# Patient Record
Sex: MALE | Race: WHITE | NOT HISPANIC OR LATINO | Employment: UNEMPLOYED | URBAN - METROPOLITAN AREA
[De-identification: names, ages, dates, MRNs, and addresses within clinical notes are randomized per-mention and may not be internally consistent; named-entity substitution may affect disease eponyms.]

---

## 2017-04-04 ENCOUNTER — HOSPITAL ENCOUNTER (EMERGENCY)
Facility: HOSPITAL | Age: 22
Discharge: HOME | End: 2017-04-04
Attending: EMERGENCY MEDICINE
Payer: MEDICAID

## 2017-04-04 VITALS
BODY MASS INDEX: 44.1 KG/M2 | OXYGEN SATURATION: 98 % | SYSTOLIC BLOOD PRESSURE: 129 MMHG | TEMPERATURE: 98 F | WEIGHT: 315 LBS | HEIGHT: 71 IN | HEART RATE: 87 BPM | DIASTOLIC BLOOD PRESSURE: 70 MMHG | RESPIRATION RATE: 18 BRPM

## 2017-04-04 DIAGNOSIS — F12.10 MARIJUANA ABUSE: ICD-10-CM

## 2017-04-04 DIAGNOSIS — F19.10 SUBSTANCE ABUSE: Primary | ICD-10-CM

## 2017-04-04 DIAGNOSIS — R41.82 ALTERED MENTAL STATUS, UNSPECIFIED ALTERED MENTAL STATUS TYPE: ICD-10-CM

## 2017-04-04 DIAGNOSIS — R74.8 ELEVATED LIVER ENZYMES: ICD-10-CM

## 2017-04-04 LAB
ALBUMIN SERPL BCP-MCNC: 3.9 G/DL
ALP SERPL-CCNC: 69 U/L
ALT SERPL W/O P-5'-P-CCNC: 788 U/L
AMPHET+METHAMPHET UR QL: NEGATIVE
ANION GAP SERPL CALC-SCNC: 10 MMOL/L
APAP SERPL-MCNC: <3 UG/ML
AST SERPL-CCNC: 387 U/L
BACTERIA #/AREA URNS HPF: NORMAL /HPF
BARBITURATES UR QL SCN>200 NG/ML: NEGATIVE
BASOPHILS # BLD AUTO: 0.02 K/UL
BASOPHILS NFR BLD: 0.3 %
BENZODIAZ UR QL SCN>200 NG/ML: NEGATIVE
BILIRUB SERPL-MCNC: 0.8 MG/DL
BILIRUB UR QL STRIP: NEGATIVE
BUN SERPL-MCNC: 12 MG/DL
BZE UR QL SCN: NEGATIVE
CALCIUM SERPL-MCNC: 8.6 MG/DL
CANNABINOIDS UR QL SCN: ABNORMAL
CHLORIDE SERPL-SCNC: 105 MMOL/L
CLARITY UR: CLEAR
CO2 SERPL-SCNC: 24 MMOL/L
COLOR UR: YELLOW
CREAT SERPL-MCNC: 1.2 MG/DL
CREAT UR-MCNC: 19.9 MG/DL
DIFFERENTIAL METHOD: ABNORMAL
EOSINOPHIL # BLD AUTO: 0.1 K/UL
EOSINOPHIL NFR BLD: 2 %
ERYTHROCYTE [DISTWIDTH] IN BLOOD BY AUTOMATED COUNT: 13.6 %
EST. GFR  (AFRICAN AMERICAN): >60 ML/MIN/1.73 M^2
EST. GFR  (NON AFRICAN AMERICAN): >60 ML/MIN/1.73 M^2
ETHANOL SERPL-MCNC: <10 MG/DL
GLUCOSE SERPL-MCNC: 133 MG/DL
GLUCOSE UR QL STRIP: NEGATIVE
HCT VFR BLD AUTO: 41.1 %
HGB BLD-MCNC: 14.3 G/DL
HGB UR QL STRIP: NEGATIVE
KETONES UR QL STRIP: NEGATIVE
LEUKOCYTE ESTERASE UR QL STRIP: ABNORMAL
LYMPHOCYTES # BLD AUTO: 1 K/UL
LYMPHOCYTES NFR BLD: 16.1 %
MCH RBC QN AUTO: 31 PG
MCHC RBC AUTO-ENTMCNC: 34.8 %
MCV RBC AUTO: 89 FL
METHADONE UR QL SCN>300 NG/ML: NEGATIVE
MICROSCOPIC COMMENT: NORMAL
MONOCYTES # BLD AUTO: 0.4 K/UL
MONOCYTES NFR BLD: 6.5 %
NEUTROPHILS # BLD AUTO: 4.8 K/UL
NEUTROPHILS NFR BLD: 75.1 %
NITRITE UR QL STRIP: NEGATIVE
OPIATES UR QL SCN: ABNORMAL
PCP UR QL SCN>25 NG/ML: NEGATIVE
PH UR STRIP: 7 [PH] (ref 5–8)
PLATELET # BLD AUTO: 280 K/UL
PMV BLD AUTO: 10.9 FL
POTASSIUM SERPL-SCNC: 3.9 MMOL/L
PROT SERPL-MCNC: 7.4 G/DL
PROT UR QL STRIP: NEGATIVE
RBC # BLD AUTO: 4.62 M/UL
SALICYLATES SERPL-MCNC: <5 MG/DL
SODIUM SERPL-SCNC: 139 MMOL/L
SP GR UR STRIP: <=1.005 (ref 1–1.03)
TOXICOLOGY INFORMATION: ABNORMAL
URN SPEC COLLECT METH UR: ABNORMAL
UROBILINOGEN UR STRIP-ACNC: NEGATIVE EU/DL
WBC # BLD AUTO: 6.44 K/UL
WBC #/AREA URNS HPF: 2 /HPF (ref 0–5)

## 2017-04-04 PROCEDURE — 82570 ASSAY OF URINE CREATININE: CPT

## 2017-04-04 PROCEDURE — 99284 EMERGENCY DEPT VISIT MOD MDM: CPT

## 2017-04-04 PROCEDURE — 85025 COMPLETE CBC W/AUTO DIFF WBC: CPT

## 2017-04-04 PROCEDURE — 80307 DRUG TEST PRSMV CHEM ANLYZR: CPT

## 2017-04-04 PROCEDURE — 80053 COMPREHEN METABOLIC PANEL: CPT

## 2017-04-04 PROCEDURE — 81000 URINALYSIS NONAUTO W/SCOPE: CPT

## 2017-04-04 PROCEDURE — 80329 ANALGESICS NON-OPIOID 1 OR 2: CPT

## 2017-04-04 PROCEDURE — 80320 DRUG SCREEN QUANTALCOHOLS: CPT

## 2017-04-04 PROCEDURE — 93010 ELECTROCARDIOGRAM REPORT: CPT | Mod: ,,, | Performed by: INTERNAL MEDICINE

## 2017-04-04 NOTE — ED PROVIDER NOTES
SCRIBE #1 NOTE: I, Wilber Urrutia, am scribing for, and in the presence of, Mae Carbajal DO. I have scribed the HPI, ROS, and PEx.     SCRIBE #2 NOTE: I, Gina Wayne/Kyle Camp, am scribing for, and in the presence of,  Bonilla Wayne MD. I have scribed the remaining portions of the note not scribed by Scribe #1.     History      Chief Complaint   Patient presents with    Altered Mental Status     pt reports smoking weed this afternoon, found unresponsive in car       Review of patient's allergies indicates:  Allergies not on file     HPI   HPI    4/4/2017, 3:07 PM   History obtained from the patient and EMS      History of Present Illness: Debbie Montano is a 22 y.o. male patient who presents to the Emergency Department for AMS which was noticed today. EMS states per fire department, pt was found in his parked vehicle in the middle of the road unresponsive and barely breathing. EMS states when they arrived on scene pt was awake, alert, and oriented x3. Pt reports smoking a gram and a half of weed and states he fell asleep in his car. Pt states he was told via police that the weed he smoked have been laced with another type of drug. Pt reports smoking weed everyday. Pt is denying any sxs at this time. Patient denies any fever, N/V/D, chills, SOB, confusion, weakness/numbness, palpitations, lightheadedness, dizziness, chest tightness, CP, HI, SI, IV drug use, ETOH abuse, sleep changes, auditory/visual hallucinations, depression and all other sxs at this time. No further complaints or concerns at this time.     Arrival mode: EMS    PCP: Unknown       Past Medical History:  Past medical history reviewed not relevant      Past Surgical History:  Past surgical history reviewed not relevant      Family History:  Family history reviewed not relevant      Social History:  Social History    Social History Main Topics    Social History Main Topics    Smoking status: Unknown if ever smoked    Smokeless tobacco:  Unknown if ever used    Alcohol Use: Unknown drinking history    Drug Use: Unknown if ever used    Sexual Activity: Unknown         ROS   Review of Systems   Constitutional: Negative for chills and fever.   HENT: Negative for congestion and sore throat.    Respiratory: Negative for chest tightness and shortness of breath.    Cardiovascular: Negative for chest pain.   Gastrointestinal: Negative for abdominal pain, nausea and vomiting.   Musculoskeletal: Negative for back pain and neck pain.   Skin: Negative for rash.   Neurological: Negative for dizziness, numbness and headaches.   Psychiatric/Behavioral: Negative for agitation and confusion.   All other systems reviewed and are negative.      Physical Exam    Initial Vitals   BP Pulse Resp Temp SpO2   04/04/17 1500 04/04/17 1500 04/04/17 1500 04/04/17 1500 04/04/17 1500   146/90 120 9 97.6 °F (36.4 °C) 92 %      Physical Exam  Nursing Notes and Vital Signs Reviewed.  Constitutional: Patient is in no apparent distress. Awake and alert. Well-developed and well-nourished. No trauma noted.   Head: Atraumatic. Normocephalic.  Eyes: PERRL. EOM intact. Conjunctivae are not pale. No scleral icterus. Bilateral eyes are blood shot.   ENT: Mucous membranes are moist. Oropharynx is clear and symmetric.    Neck: Supple. Full ROM. No lymphadenopathy.  Cardiovascular: Regular rate. Regular rhythm. No murmurs, rubs, or gallops. Distal pulses are 2+ and symmetric.  Pulmonary/Chest: No respiratory distress. Clear to auscultation bilaterally. No wheezing, rales, or rhonchi.  Abdominal: Soft and non-distended.  There is no tenderness.  No rebound, guarding, or rigidity. Good bowel sounds.  Musculoskeletal: Moves all extremities. No obvious deformities. No edema. No calf tenderness. No trauma noted.  Back: Skin appears normal without abrasions or bruising. No erythema, induration, or fluctuance.   Skin: Warm and dry.  Neurological:  Alert, awake, oriented x3, and appropriate. Pt is  "laying comfortably in ED bed and using his phone.  Normal speech.  No acute focal neurological deficits are appreciated.  Psychiatric: Normal affect. Good eye contact. Appropriate in content.    ED Course    Procedures  ED Vital Signs:  Vitals:    04/04/17 1500 04/04/17 1507 04/04/17 1523 04/04/17 1540   BP: (!) 146/90 (!) 146/90 126/73    Pulse: (!) 120 (!) 120 (!) 113 (!) 113   Resp: (!) 9 14 20    Temp: 97.6 °F (36.4 °C) 98 °F (36.7 °C)     TempSrc: Oral Oral     SpO2: (!) 92% (!) 93% 98%    Weight: (!) 149.7 kg (330 lb) (!) 149.7 kg (330 lb)     Height: 5' 11" (1.803 m) 5' 11" (1.803 m)      04/04/17 1606 04/04/17 1637   BP: 135/77 129/70   Pulse: 107 87   Resp: 20 18   Temp:     TempSrc:     SpO2: 95% 98%   Weight:     Height:         Abnormal Lab Results:  Labs Reviewed   CBC W/ AUTO DIFFERENTIAL - Abnormal; Notable for the following:        Result Value    Gran% 75.1 (*)     Lymph% 16.1 (*)     All other components within normal limits   COMPREHENSIVE METABOLIC PANEL - Abnormal; Notable for the following:     Glucose 133 (*)     Calcium 8.6 (*)      (*)      (*)     All other components within normal limits   SALICYLATE LEVEL - Abnormal; Notable for the following:     Salicylate Lvl <5.0 (*)     All other components within normal limits   ACETAMINOPHEN LEVEL - Abnormal; Notable for the following:     Acetaminophen (Tylenol), Serum <3.0 (*)     All other components within normal limits   DRUG SCREEN PANEL, URINE EMERGENCY - Abnormal; Notable for the following:     Creatinine, Random Ur 19.9 (*)     All other components within normal limits   URINALYSIS - Abnormal; Notable for the following:     Specific Gravity, UA <=1.005 (*)     Leukocytes, UA Trace (*)     All other components within normal limits   ALCOHOL,MEDICAL (ETHANOL)   URINALYSIS MICROSCOPIC        All Lab Results:  Results for orders placed or performed during the hospital encounter of 04/04/17   CBC auto differential   Result Value " Ref Range    WBC 6.44 3.90 - 12.70 K/uL    RBC 4.62 4.60 - 6.20 M/uL    Hemoglobin 14.3 14.0 - 18.0 g/dL    Hematocrit 41.1 40.0 - 54.0 %    MCV 89 82 - 98 fL    MCH 31.0 27.0 - 31.0 pg    MCHC 34.8 32.0 - 36.0 %    RDW 13.6 11.5 - 14.5 %    Platelets 280 150 - 350 K/uL    MPV 10.9 9.2 - 12.9 fL    Gran # 4.8 1.8 - 7.7 K/uL    Lymph # 1.0 1.0 - 4.8 K/uL    Mono # 0.4 0.3 - 1.0 K/uL    Eos # 0.1 0.0 - 0.5 K/uL    Baso # 0.02 0.00 - 0.20 K/uL    Gran% 75.1 (H) 38.0 - 73.0 %    Lymph% 16.1 (L) 18.0 - 48.0 %    Mono% 6.5 4.0 - 15.0 %    Eosinophil% 2.0 0.0 - 8.0 %    Basophil% 0.3 0.0 - 1.9 %    Differential Method Automated    Comprehensive metabolic panel   Result Value Ref Range    Sodium 139 136 - 145 mmol/L    Potassium 3.9 3.5 - 5.1 mmol/L    Chloride 105 95 - 110 mmol/L    CO2 24 23 - 29 mmol/L    Glucose 133 (H) 70 - 110 mg/dL    BUN, Bld 12 6 - 20 mg/dL    Creatinine 1.2 0.5 - 1.4 mg/dL    Calcium 8.6 (L) 8.7 - 10.5 mg/dL    Total Protein 7.4 6.0 - 8.4 g/dL    Albumin 3.9 3.5 - 5.2 g/dL    Total Bilirubin 0.8 0.1 - 1.0 mg/dL    Alkaline Phosphatase 69 55 - 135 U/L     (H) 10 - 40 U/L     (H) 10 - 44 U/L    Anion Gap 10 8 - 16 mmol/L    eGFR if African American >60 >60 mL/min/1.73 m^2    eGFR if non African American >60 >60 mL/min/1.73 m^2   Ethanol   Result Value Ref Range    Alcohol, Medical, Serum <10 <10 mg/dL   Salicylate level   Result Value Ref Range    Salicylate Lvl <5.0 (L) 15.0 - 30.0 mg/dL   Acetaminophen level   Result Value Ref Range    Acetaminophen (Tylenol), Serum <3.0 (L) 10.0 - 20.0 ug/mL   Drug screen panel, emergency   Result Value Ref Range    Benzodiazepines Negative     Methadone metabolites Negative     Cocaine (Metab.) Negative     Opiate Scrn, Ur Presumptive Positive     Barbiturate Screen, Ur Negative     Amphetamine Screen, Ur Negative     THC Presumptive Positive     Phencyclidine Negative     Creatinine, Random Ur 19.9 (L) 23.0 - 375.0 mg/dL    Toxicology Information  "SEE COMMENT    Urinalysis   Result Value Ref Range    Specimen UA Urine, Clean Catch     Color, UA Yellow Yellow, Straw, Nury    Appearance, UA Clear Clear    pH, UA 7.0 5.0 - 8.0    Specific Gravity, UA <=1.005 (A) 1.005 - 1.030    Protein, UA Negative Negative    Glucose, UA Negative Negative    Ketones, UA Negative Negative    Bilirubin (UA) Negative Negative    Occult Blood UA Negative Negative    Nitrite, UA Negative Negative    Urobilinogen, UA Negative <2.0 EU/dL    Leukocytes, UA Trace (A) Negative   Urinalysis Microscopic   Result Value Ref Range    WBC, UA 2 0 - 5 /hpf    Bacteria, UA Occasional None-Occ /hpf    Microscopic Comment SEE COMMENT             The EKG was ordered, reviewed, and independently interpreted by the ED provider.  Interpretation time: 1529  Rate: 113 BPM  Rhythm: sinus tachycardia  Interpretation: Otherwise normal EKG. No STEMI.         The Emergency Provider reviewed the vital signs and test results, which are outlined above.    ED Discussion     4:20 PM: Dr. Carbajal transfers care of pt to Dr. Wayne, pending lab results.    4:43 PM: Reassessed pt at this time.  Pt states his condition has improved at this time. Pt is awake, alert, and interactive. Pt is currently ambulatory in ED. Pt reports smoking marijuana that was "laced with something". Pt was discharged with substance abuse information. Discussed with pt all pertinent ED information and results. Discussed pt dx and plan of tx. Gave pt all f/u and return to the ED instructions. All questions and concerns were addressed at this time. Pt expresses understanding of information and instructions, and is comfortable with plan to discharge. Pt is stable for discharge.    Pre-hypertension/Hypertension: The pt has been informed that they may have pre-hypertension or hypertension based on a blood pressure reading in the ED. I recommend that the pt call the PCP listed on their discharge instructions or a physician of their choice this " week to arrange f/u for further evaluation of possible pre-hypertension or hypertension.       ED Medication(s):  Medications - No data to display    There are no discharge medications for this patient.      Follow-up Information     Call [facility].    Why:  As needed to schedule appt for recheck- use substance abuse referrals given to you as needed to get help if you feel as if you have a problem with drug abuse     Contact information:    [address] [facility] [id number] [phone]              Medical Decision Making    Medical Decision Making:   Clinical Tests:   Lab Tests: Ordered and Reviewed  Medical Tests: Reviewed and Ordered           Scribe Attestation:   Scribe #1: I performed the above scribed service and the documentation accurately describes the services I performed. I attest to the accuracy of the note.    Attending:   Physician Attestation Statement for Scribe #1: I, Mae Carbajal DO, personally performed the services described in this documentation, as scribed by Wilber Urrutia, in my presence, and it is both accurate and complete.       Scribe Attestation:   Scribe #2: I performed the above scribed service and the documentation accurately describes the services I performed. I attest to the accuracy of the note.    Attending Attestation:           Physician Attestation for Scribe:    Physician Attestation Statement for Scribe #2: I, Bonilla Wayne MD, reviewed documentation, as scribed by Gina Wayne/Kyle Camp in my presence, and it is both accurate and complete. I also acknowledge and confirm the content of the note done by Scribe #1.          Clinical Impression       ICD-10-CM ICD-9-CM   1. Substance abuse F19.10 305.90   2. Marijuana abuse F12.10 305.20   3. Altered mental status, unspecified altered mental status type R41.82 780.97   4. Elevated liver enzymes R74.8 790.5       Disposition:   Disposition: Discharged  Condition: Stable         Mae PANTOJA  DO Raven  04/05/17 4338

## 2017-04-04 NOTE — ED AVS SNAPSHOT
"          [facility]  [address] [facility] [address]               Debbie Montano   2017  3:01 PM   ED    Description:  Male : 1995   Department:  [facility]            Your Care was Coordinated By:     Provider Role From To    Mae Carbajal DO Attending Provider 17 1506 17 1629    Ankur Wayne Jr., MD Attending Provider 17 1629 --      Reason for Visit     Altered Mental Status           Diagnoses this Visit        Comments    Substance abuse    -  Primary     Marijuana abuse         Altered mental status, unspecified altered mental status type           ED Disposition     None           To Do List           Follow-up Information     Call [facility].    Why:  As needed to schedule appt for recheck- use substance abuse referrals given to you as needed to get help if you feel as if you have a problem with drug abuse     Contact information:    [address] [facility] [address]  [phone]        [facility]     [facility] -  Assistance  Unless otherwise directed by your provider, please contact [facility], our nurse care line that is available for  assistance.     Registered nurses in the [facility] provide: appointment scheduling, clinical advisement, health education, and other advisory services.  Call: [phone] (toll free)               Medications                Verify that the below list of medications is an accurate representation of the medications you are currently taking.  If none reported, the list may be blank. If incorrect, please contact your healthcare provider. Carry this list with you in case of emergency.                Clinical Reference Information           Your Vitals Were     BP Pulse Temp Resp Height Weight    135/77 107 98 °F (36.7 °C) (Oral) 20 5' 11" (1.803 m) 149.7 kg (330 lb)    SpO2 BMI             95% 46.03 kg/m2    " "     Allergies as of 4/4/2017     No Known Allergies      Immunizations Administered on Date of Encounter - 4/4/2017     None      ED Micro, Lab, POCT     Start Ordered       Status Ordering Provider    04/04/17 1510 04/04/17 1509  Drug screen panel, emergency  STAT      Final result     04/04/17 1510 04/04/17 1509  Urinalysis  STAT      Final result     04/04/17 1509 04/04/17 1509  Ethanol  STAT      Final result     04/04/17 1509 04/04/17 1509  Salicylate level  STAT      Final result     04/04/17 1509 04/04/17 1509  Acetaminophen level  STAT      Final result     04/04/17 1509 04/04/17 1509  Urinalysis Microscopic  Once      Final result     04/04/17 1508 04/04/17 1507  CBC auto differential  STAT      Final result     04/04/17 1508 04/04/17 1507  Comprehensive metabolic panel  STAT      Final result       ED Imaging Orders     None        Discharge Instructions         Addiction: Getting Help     "It was hard to admit that I had a problem. But when I did, I had to get help."   Admitting that you have a substance abuse problem isnt easy. It takes courage and honesty to admit youre abusing alcohol or other drugs. But once youre ready to look at your use, youve taken a big step toward overcoming the problem. When you face your problem, you also accept that youre accountable for your actions and for changing them. There are many programs and people who can help you overcome your problem. And remember, its OK to get help. Its also the first step to getting your life back together.  Getting help and support  Recovery doesnt happen overnight. Its a lifelong process with many steps along the way. During those steps, youll work on changing the things that were part of your substance abuse. A counselor or other health care provider can give you support. So can a , , or rabbi who is trained in substance abuse counseling. Friends and family may also help once you are connected with " professionals. Together you can decide on lifestyle changes necessary to success, enabling you to have a positive and rewarding life.  Date Last Reviewed: 11/11/2014 © 2000-2016 [employer]. [address] [place] [address], [place] [address]. All rights reserved. This information is not intended as a substitute for professional medical care. Always follow your healthcare professional's instructions.          Drug Abuse  Use and abuse of drugs like marijuana, amphetamines (speed, crank), cocaine, heroin or prescription pain medicines, sedatives and sleeping pills, MDMA, ecstasy, bath salts, PCP, mescaline and LSD may lead to addiction or dependence. Once this happens, you are at greater risk for any of the following:  Social and personal problems  · Craving for the drug and not able to stop using even though you think you want to stop (psychological addiction)  · Drug withdrawal symptoms if you stop taking the drug (physical dependence)  · Loss of job or your family  · Arrest, conviction, and nursing home sentence for possession of an illegal substance or for driving under the influence  Health problems  · Strokes, heart attacks, kidney failure  · Accidental injuries to yourself or others while you are under the influence of the drug (in a car or at home)  · HIV infection (much greater risk if you use IV drugs)  · Skin infections  · Other sexually transmitted disease (STDs such as herpes, chlamydia, gonorrhea, and others)  · Severe and fatal infection of the heart valves (if you use IV drugs)  · Hepatitis B or C  · Death from overdose  Home care  The following suggestions can help you care for yourself at home:  · Admit you have a drug problem. Ask for help from your family and close friends.  · Seek professional help. This could be in the form of individual psychotherapy or counseling. There are also outpatient, inpatient, and residential drug treatment programs.  · Join a self-help group for drug abuse.  · Stay away  from friends who abuse drugs or tempt you to continue abusing drugs.  · Eat a balanced diet and start a regular exercise program.  Follow-up care  Follow up with your healthcare provider, or as advised. Contact one of the resources below for help:  · National [place] on Alcoholism and Drug Dependence  www.ncadd.org  199.908.6236  · Narcotics Anonymous  www.na.org  181.327.8533  · National Alcohol and Substance Abuse Information Center (for referral to treatment programs)  www.interspireSubmit  724.336.6343  Call 911  Call 911 if any of the following occur:  · Seizure  · Hard time breathing or slow, irregular breathing  · Chest pain  · Sudden weakness on one side of your body or sudden trouble speaking  · Very drowsy or trouble awakening  · Fainting or loss of consciousness  · Rapid heart rate  · Very slow heart rate  When to seek medical advice  Call your healthcare provider right away if any of these occur:  · Agitation, anxiety, unable to sleep  · Unintended weight loss (more than 10 to 15 pounds over 3 months)  · Fever of 100.4°F (38°C) or higher, or as directed by your healthcare provider  · Shortness of breath  · Cough with colored sputum  · Redness, swelling, or tenderness at an injection site  Date Last Reviewed: 6/1/2016  © 8370-2356 [employer]. [address] [place] [address]. All rights reserved. This information is not intended as a substitute for professional medical care. Always follow your healthcare professional's instructions.          [facility] Sign-Up     Activating your [facility] account is as easy as 1-2-3!     1) Visit [email or web address], select Sign Up Now, enter this activation code and your date of birth, then select Next.  [email or web address]  Expires: 5/19/2017  4:42 PM      2) Create a username and password to use when you visit [facility] in the future and select a security question in case you lose your password and select Next.    3) Enter your e-mail address  and click Sign Up!    Additional Information  If you have questions, please e-mail [email or web address] or call [phone] to talk to our [employer] staff. Remember, [facility] is NOT to be used for urgent needs. For medical emergencies, dial 911.         Smoking Cessation     If you would like to quit smoking:   You may be eligible for free services if you are a [place] resident and started smoking cigarettes before September 1, 1988.  Call the Smoking Cessation Trust ([facility]) toll free at [phone] or [phone].   Call 0-109-QUIT-NOW if you do not meet the above criteria.   Contact us via email: [email or web address]   View our website for more information: [email or web address]         [facility]  complies with applicable Federal civil rights laws and does not discriminate on the basis of race, color, national origin, age, disability, or sex.        Language Assistance Services     ATTENTION: Language assistance services are available, free of charge. Please call [phone].      ATENCIÓN: Si habla español, tiene a moss disposición servicios gratuitos de asistencia lingüística. Llame al [phone].     CHÚ Ý: N?u b?n nói Ti?ng Vi?t, có các d?ch v? h? tr? ngôn ng? mi?n phí dành cho b?n. G?i s? [phone].

## 2017-04-04 NOTE — DISCHARGE INSTRUCTIONS
"  Addiction: Getting Help     "It was hard to admit that I had a problem. But when I did, I had to get help."   Admitting that you have a substance abuse problem isnt easy. It takes courage and honesty to admit youre abusing alcohol or other drugs. But once youre ready to look at your use, youve taken a big step toward overcoming the problem. When you face your problem, you also accept that youre accountable for your actions and for changing them. There are many programs and people who can help you overcome your problem. And remember, its OK to get help. Its also the first step to getting your life back together.  Getting help and support  Recovery doesnt happen overnight. Its a lifelong process with many steps along the way. During those steps, youll work on changing the things that were part of your substance abuse. A counselor or other health care provider can give you support. So can a , , or rabbi who is trained in substance abuse counseling. Friends and family may also help once you are connected with professionals. Together you can decide on lifestyle changes necessary to success, enabling you to have a positive and rewarding life.  Date Last Reviewed: 11/11/2014  © 1917-3267 [employer]. [address] [place] [address], [place] [address]. All rights reserved. This information is not intended as a substitute for professional medical care. Always follow your healthcare professional's instructions.          Drug Abuse  Use and abuse of drugs like marijuana, amphetamines (speed, crank), cocaine, heroin or prescription pain medicines, sedatives and sleeping pills, MDMA, ecstasy, bath salts, PCP, mescaline and LSD may lead to addiction or dependence. Once this happens, you are at greater risk for any of the following:  Social and personal problems  · Craving for the drug and not able to stop using even though you think you want to stop (psychological addiction)  · Drug withdrawal " symptoms if you stop taking the drug (physical dependence)  · Loss of job or your family  · Arrest, conviction, and detention sentence for possession of an illegal substance or for driving under the influence  Health problems  · Strokes, heart attacks, kidney failure  · Accidental injuries to yourself or others while you are under the influence of the drug (in a car or at home)  · HIV infection (much greater risk if you use IV drugs)  · Skin infections  · Other sexually transmitted disease (STDs such as herpes, chlamydia, gonorrhea, and others)  · Severe and fatal infection of the heart valves (if you use IV drugs)  · Hepatitis B or C  · Death from overdose  Home care  The following suggestions can help you care for yourself at home:  · Admit you have a drug problem. Ask for help from your family and close friends.  · Seek professional help. This could be in the form of individual psychotherapy or counseling. There are also outpatient, inpatient, and residential drug treatment programs.  · Join a self-help group for drug abuse.  · Stay away from friends who abuse drugs or tempt you to continue abusing drugs.  · Eat a balanced diet and start a regular exercise program.  Follow-up care  Follow up with your healthcare provider, or as advised. Contact one of the resources below for help:  · National [place] on Alcoholism and Drug Dependence  www.ncadd.org  115.943.4518  · Narcotics Anonymous  www.na.org  888.428.7643  · National Alcohol and Substance Abuse Information Center (for referral to treatment programs)  www.addictionSegment  701.505.7502  Call 911  Call 911 if any of the following occur:  · Seizure  · Hard time breathing or slow, irregular breathing  · Chest pain  · Sudden weakness on one side of your body or sudden trouble speaking  · Very drowsy or trouble awakening  · Fainting or loss of consciousness  · Rapid heart rate  · Very slow heart rate  When to seek medical advice  Call your healthcare provider  right away if any of these occur:  · Agitation, anxiety, unable to sleep  · Unintended weight loss (more than 10 to 15 pounds over 3 months)  · Fever of 100.4°F (38°C) or higher, or as directed by your healthcare provider  · Shortness of breath  · Cough with colored sputum  · Redness, swelling, or tenderness at an injection site  Date Last Reviewed: 6/1/2016  © 2091-6434 [employer]. [address] [place] [address], [place] [address]. All rights reserved. This information is not intended as a substitute for professional medical care. Always follow your healthcare professional's instructions.